# Patient Record
Sex: MALE | Race: ASIAN | Employment: OTHER | URBAN - METROPOLITAN AREA
[De-identification: names, ages, dates, MRNs, and addresses within clinical notes are randomized per-mention and may not be internally consistent; named-entity substitution may affect disease eponyms.]

---

## 2017-01-03 ENCOUNTER — OFFICE VISIT (OUTPATIENT)
Dept: FAMILY MEDICINE CLINIC | Facility: CLINIC | Age: 77
End: 2017-01-03

## 2017-01-03 ENCOUNTER — LAB ENCOUNTER (OUTPATIENT)
Dept: LAB | Age: 77
End: 2017-01-03
Attending: FAMILY MEDICINE

## 2017-01-03 VITALS
TEMPERATURE: 97 F | RESPIRATION RATE: 12 BRPM | DIASTOLIC BLOOD PRESSURE: 60 MMHG | BODY MASS INDEX: 23 KG/M2 | HEART RATE: 76 BPM | WEIGHT: 143 LBS | SYSTOLIC BLOOD PRESSURE: 104 MMHG

## 2017-01-03 DIAGNOSIS — IMO0001 UNCONTROLLED TYPE 2 DIABETES MELLITUS WITHOUT COMPLICATION, WITHOUT LONG-TERM CURRENT USE OF INSULIN: ICD-10-CM

## 2017-01-03 DIAGNOSIS — IMO0001 UNCONTROLLED TYPE 2 DIABETES MELLITUS WITHOUT COMPLICATION, WITHOUT LONG-TERM CURRENT USE OF INSULIN: Primary | ICD-10-CM

## 2017-01-03 DIAGNOSIS — R97.20 ELEVATED PSA: ICD-10-CM

## 2017-01-03 PROBLEM — IMO0002 TYPE 2 DIABETES MELLITUS, UNCONTROLLED: Status: ACTIVE | Noted: 2017-01-03

## 2017-01-03 PROBLEM — E11.65 TYPE 2 DIABETES MELLITUS, UNCONTROLLED (HCC): Status: ACTIVE | Noted: 2017-01-03

## 2017-01-03 LAB
CREAT UR-SCNC: 21 MG/DL
MICROALBUMIN UR-MCNC: 0.62 MG/DL
MICROALBUMIN/CREAT 24H UR-RTO: 29.5 UG/MG (ref ?–30)
PSA FREE MFR SERPL: 29 %
PSA FREE SERPL-MCNC: 1.75 NG/ML
PSA SERPL-MCNC: 5.99 NG/ML (ref 0.01–4)

## 2017-01-03 PROCEDURE — 82043 UR ALBUMIN QUANTITATIVE: CPT

## 2017-01-03 PROCEDURE — 82570 ASSAY OF URINE CREATININE: CPT

## 2017-01-03 PROCEDURE — 84153 ASSAY OF PSA TOTAL: CPT

## 2017-01-03 PROCEDURE — 36415 COLL VENOUS BLD VENIPUNCTURE: CPT

## 2017-01-03 PROCEDURE — 99214 OFFICE O/P EST MOD 30 MIN: CPT | Performed by: FAMILY MEDICINE

## 2017-01-03 PROCEDURE — 84154 ASSAY OF PSA FREE: CPT

## 2017-01-03 RX ORDER — METFORMIN HYDROCHLORIDE 500 MG/1
500 TABLET, EXTENDED RELEASE ORAL
Qty: 90 TABLET | Refills: 1 | Status: SHIPPED | OUTPATIENT
Start: 2017-01-03 | End: 2018-04-20

## 2017-01-03 NOTE — PROGRESS NOTES
New onset of NIDDM    Patient is here to discuss his labs from 12/29/16. There are remarkable for a glucose elevated at 139. A hemoglobin A1c was elevated at 7.8.    We discussed that this was compatible with a diagnosis of NIDDM  We discussed the need to

## 2017-01-18 ENCOUNTER — OFFICE VISIT (OUTPATIENT)
Dept: SLEEP CENTER | Facility: HOSPITAL | Age: 77
End: 2017-01-18
Attending: INTERNAL MEDICINE
Payer: COMMERCIAL

## 2017-01-18 PROCEDURE — 95810 POLYSOM 6/> YRS 4/> PARAM: CPT

## 2017-01-20 NOTE — PROCEDURES
[address]       Accredited by the [facility] ([facility])    PATIENT'S NAME:        Pau Rogers  ATTENDING PHYSICIAN:   Mehul Banks M.D. REFERRING PHYSICIAN:   Ananya Griffin M.D.   PAT total of 19 minutes. Patient spent all night in the supine position. RESPIRATORY MONITORING:  Showed a few hypopneas, no central or obstructive apneas.   Most of his hypopneas happened during REM sleep, he had 9 hypopneas during his 19 minutes of REM sl

## 2017-05-17 ENCOUNTER — TELEPHONE (OUTPATIENT)
Dept: FAMILY MEDICINE CLINIC | Facility: CLINIC | Age: 77
End: 2017-05-17

## 2017-05-17 DIAGNOSIS — E11.9 DIABETIC EYE EXAM (HCC): Primary | ICD-10-CM

## 2017-05-17 DIAGNOSIS — Z01.00 DIABETIC EYE EXAM (HCC): Primary | ICD-10-CM

## 2017-06-12 ENCOUNTER — TELEPHONE (OUTPATIENT)
Dept: FAMILY MEDICINE CLINIC | Facility: CLINIC | Age: 77
End: 2017-06-12

## 2017-06-12 NOTE — TELEPHONE ENCOUNTER
Pt calling to discuss medication with Dr. Valerie Carcamo. Voiced that on his metformin, his blood sugars have been running around 149 before breakfast and around 138 after he exercises.  Pt relayed that he did not take his medication for almost a month or more a

## 2017-06-12 NOTE — TELEPHONE ENCOUNTER
Pt advised to cont taking med as prescribed (pt takes it erratically)   Check blood sugar as prescribed, log it regularly (pt don't regularly check his BS)   Do his due A1C lab work   Follow diabetic diet as recommended    Doing so the provider will be be

## 2017-06-16 ENCOUNTER — APPOINTMENT (OUTPATIENT)
Dept: LAB | Age: 77
End: 2017-06-16
Attending: FAMILY MEDICINE
Payer: COMMERCIAL

## 2017-06-16 DIAGNOSIS — IMO0001 UNCONTROLLED TYPE 2 DIABETES MELLITUS WITHOUT COMPLICATION, WITHOUT LONG-TERM CURRENT USE OF INSULIN: ICD-10-CM

## 2017-06-16 PROCEDURE — 80061 LIPID PANEL: CPT

## 2017-06-16 PROCEDURE — 36415 COLL VENOUS BLD VENIPUNCTURE: CPT

## 2017-06-16 PROCEDURE — 83036 HEMOGLOBIN GLYCOSYLATED A1C: CPT

## 2017-06-16 PROCEDURE — 80053 COMPREHEN METABOLIC PANEL: CPT

## 2017-06-21 ENCOUNTER — TELEPHONE (OUTPATIENT)
Dept: FAMILY MEDICINE CLINIC | Facility: CLINIC | Age: 77
End: 2017-06-21

## 2017-07-07 RX ORDER — BLOOD-GLUCOSE METER
1 EACH MISCELLANEOUS 2 TIMES DAILY
Qty: 1 DEVICE | Refills: 0 | COMMUNITY
Start: 2017-07-07 | End: 2021-04-16

## 2018-04-20 ENCOUNTER — OFFICE VISIT (OUTPATIENT)
Dept: FAMILY MEDICINE CLINIC | Facility: CLINIC | Age: 78
End: 2018-04-20

## 2018-04-20 VITALS
HEIGHT: 66.25 IN | DIASTOLIC BLOOD PRESSURE: 64 MMHG | WEIGHT: 140 LBS | TEMPERATURE: 97 F | SYSTOLIC BLOOD PRESSURE: 120 MMHG | BODY MASS INDEX: 22.5 KG/M2 | RESPIRATION RATE: 16 BRPM | HEART RATE: 68 BPM

## 2018-04-20 DIAGNOSIS — IMO0001 UNCONTROLLED TYPE 2 DIABETES MELLITUS WITHOUT COMPLICATION, WITHOUT LONG-TERM CURRENT USE OF INSULIN: ICD-10-CM

## 2018-04-20 DIAGNOSIS — Z13.220 SCREENING FOR LIPOID DISORDERS: ICD-10-CM

## 2018-04-20 DIAGNOSIS — Z13.89 SCREENING FOR GENITOURINARY CONDITION: ICD-10-CM

## 2018-04-20 DIAGNOSIS — Z13.0 SCREENING, ANEMIA, DEFICIENCY, IRON: ICD-10-CM

## 2018-04-20 DIAGNOSIS — R97.20 ELEVATED PSA: ICD-10-CM

## 2018-04-20 DIAGNOSIS — Z00.00 ANNUAL PHYSICAL EXAM: Primary | ICD-10-CM

## 2018-04-20 PROCEDURE — 99397 PER PM REEVAL EST PAT 65+ YR: CPT | Performed by: FAMILY MEDICINE

## 2018-04-20 RX ORDER — METFORMIN HYDROCHLORIDE 500 MG/1
500 TABLET, EXTENDED RELEASE ORAL
Qty: 90 TABLET | Refills: 1 | Status: SHIPPED | OUTPATIENT
Start: 2018-04-20 | End: 2018-04-23 | Stop reason: DRUGHIGH

## 2018-04-20 NOTE — PROGRESS NOTES
Linda Henry is a 69year old male who presents for a complete physical exam.   HPI:   Pt is he took himself off his metformin. He has noticed that his blood sugars have been higher. Has no other complaints at this time.   .  Wt Readings from Last 6 Encount HEMORRHOIDECTOMY    FAMILY HISTORY:     Family History   Problem Relation Age of Onset   • Hypertension Mother    • Diabetes Mother      type 2       SOCIAL HISTORY   Smoking status: Never Smoker or edema  NEURO: Oriented times three,cranial nerves are intact      ASSESSMENT :   Annual physical exam  (primary encounter diagnosis)  Uncontrolled type 2 diabetes mellitus without complication, without long-term current use of insulin (hcc)  Screening,

## 2018-05-04 ENCOUNTER — PATIENT MESSAGE (OUTPATIENT)
Dept: FAMILY MEDICINE CLINIC | Facility: CLINIC | Age: 78
End: 2018-05-04

## 2018-05-06 NOTE — TELEPHONE ENCOUNTER
Dipak Lemus LPN 2/9/5526 0:39 PM CDT        ----- Message -----  From: Mary Carter  Sent: 5/4/2018 8:33 AM  To: Emg [facility] Clinical Staff  Subject: Test Results Question     Hi, Doctor Guillermo Julian,  I wonder if you have time to go over my recent test resul

## 2018-05-14 NOTE — TELEPHONE ENCOUNTER
Call from pt stating he had labs done approx 2 wks ago-\"never got a call with my results. Have sent messages asking about results and have not heard back. \"  Labs from 5/1/18 reviewed noting result note from shasta DAVIS-\"Dr. Janice Banuelos patient. Tanya Ashley bhatt

## 2018-06-20 NOTE — TELEPHONE ENCOUNTER
From: Jason Silva  Sent: 6/18/2018 7:10 AM CDT  Subject: Medication Renewal Request    Jason Silva would like a refill of the following medications:     MetFORMIN HCl 1000 MG Oral Tab   Patient Comment: In the last week of taking 1000mg, my sugar levels are

## 2018-06-21 NOTE — TELEPHONE ENCOUNTER
From: Kristan Garza  Sent: 6/19/2018 9:57 AM CDT  Subject: Medication Renewal Request    Kristan Garza would like a refill of the following medications:     MetFORMIN HCl 1000 MG Oral Tab   Patient Comment: Doctor Jocelin Harris: I sent you a message yesterday about the result of my medication. It does reduce my sugar levels. Please advise what to do next.     Preferred pharmacy: [address], [address] [place] [address]  [address] [place] [address] [phone], [phone]

## 2018-09-24 NOTE — TELEPHONE ENCOUNTER
*please call pt/schedule med visit october-at April ofc visit dr Teresa Collins advised med visit appt by October 20th. No appts scheduled. Once appt scheduled, please route back to triage for metformin refill.      [facility]-sts they sent

## 2018-11-06 ENCOUNTER — OFFICE VISIT (OUTPATIENT)
Dept: FAMILY MEDICINE CLINIC | Facility: CLINIC | Age: 78
End: 2018-11-06
Payer: COMMERCIAL

## 2018-11-06 VITALS
DIASTOLIC BLOOD PRESSURE: 60 MMHG | RESPIRATION RATE: 12 BRPM | WEIGHT: 139 LBS | HEART RATE: 68 BPM | BODY MASS INDEX: 22.08 KG/M2 | HEIGHT: 66.5 IN | TEMPERATURE: 98 F | SYSTOLIC BLOOD PRESSURE: 110 MMHG

## 2018-11-06 DIAGNOSIS — Z13.0 SCREENING, ANEMIA, DEFICIENCY, IRON: ICD-10-CM

## 2018-11-06 DIAGNOSIS — R97.20 ELEVATED PSA: ICD-10-CM

## 2018-11-06 DIAGNOSIS — E11.65 UNCONTROLLED TYPE 2 DIABETES MELLITUS WITH HYPERGLYCEMIA (HCC): Primary | ICD-10-CM

## 2018-11-06 DIAGNOSIS — Z13.220 SCREENING FOR LIPOID DISORDERS: ICD-10-CM

## 2018-11-06 PROCEDURE — 99214 OFFICE O/P EST MOD 30 MIN: CPT | Performed by: FAMILY MEDICINE

## 2018-11-06 NOTE — PROGRESS NOTES
Dawson Billings is a 69year old male. HPI:   The patient presents for recheck of his diabetes. Patient’s FBS have been 120-150. Last HgbA1c was 9.6 %, done on 5/1/2018. Last visit with ophthalmologist was 4/16/2018.  Last microalbumin was less than 0.2 on 5/1/ normal, visualized feet and the appearance is normal. Bilateral monofilament/sensation of both feet is normal.    ASSESSMENT AND PLAN:   1.  Uncontrolled type 2 diabetes mellitus with hyperglycemia (HCC)  Continue metformin 1000 mg p.o. twice daily  - COMP

## 2018-12-07 ENCOUNTER — TELEPHONE (OUTPATIENT)
Dept: FAMILY MEDICINE CLINIC | Facility: CLINIC | Age: 78
End: 2018-12-07

## 2018-12-07 DIAGNOSIS — Z13.220 SCREENING FOR LIPOID DISORDERS: ICD-10-CM

## 2018-12-07 DIAGNOSIS — E11.65 UNCONTROLLED TYPE 2 DIABETES MELLITUS WITH HYPERGLYCEMIA (HCC): Primary | ICD-10-CM

## 2018-12-07 DIAGNOSIS — R97.20 ELEVATED PSA: ICD-10-CM

## 2018-12-07 DIAGNOSIS — Z13.0 SCREENING, ANEMIA, DEFICIENCY, IRON: ICD-10-CM

## 2018-12-07 NOTE — TELEPHONE ENCOUNTER
Pt transferred to nurse. He reports [facility] this am and no orders were there. I apologized and advised his orders look like they were placed for edw.     He said he s/w a nurse this am and they were supposed to fax orders to [facility]? He said it

## 2018-12-11 DIAGNOSIS — E11.65 UNCONTROLLED TYPE 2 DIABETES MELLITUS WITH HYPERGLYCEMIA (HCC): ICD-10-CM

## 2018-12-11 DIAGNOSIS — R73.9 HYPERGLYCEMIA: Primary | ICD-10-CM

## 2018-12-11 DIAGNOSIS — E78.00 ELEVATED LDL CHOLESTEROL LEVEL: ICD-10-CM

## 2018-12-12 ENCOUNTER — TELEPHONE (OUTPATIENT)
Dept: FAMILY MEDICINE CLINIC | Facility: CLINIC | Age: 78
End: 2018-12-12

## 2018-12-12 NOTE — TELEPHONE ENCOUNTER
Patient requests MetFORMIN HCl 1000 MG Oral Tab sent to [facility]. sent to [facility]. on [address]. He does not need the coupon for Januvia anymore. That was the original reason for this phone call.

## 2018-12-12 NOTE — TELEPHONE ENCOUNTER
Pt called and he said that he needs a coupon for Januvia. He said that Dr. Melody Layne. Usually does this for him. Please advise.

## 2018-12-12 NOTE — TELEPHONE ENCOUNTER
I checked sample closet. We do have coupon cards. They  2018  I have left up front for him to . Front staff to let him know.

## 2018-12-12 NOTE — TELEPHONE ENCOUNTER
Per chart pt should have refill on this still. Cornelius Mariano confirms this. Tc to pt and let him know. He voiced understanding.

## 2019-03-11 RX ORDER — SITAGLIPTIN 50 MG/1
TABLET, FILM COATED ORAL
Qty: 90 TABLET | Refills: 0 | Status: SHIPPED | OUTPATIENT
Start: 2019-03-11 | End: 2019-06-01

## 2019-03-27 NOTE — TELEPHONE ENCOUNTER
metFORMIN HCl Oral Tablet 1000 MG  Diabetic Medication Protocol Failed    He failed protocol due to his last Hemoglobin A1c. His last A1c was on 12/07/2018 with a level of 7.8. Please see pended medications. Please sign if appropriate.       Thank

## 2019-06-03 RX ORDER — SITAGLIPTIN 50 MG/1
TABLET, FILM COATED ORAL
Qty: 90 TABLET | Refills: 0 | Status: SHIPPED | OUTPATIENT
Start: 2019-06-03 | End: 2019-12-11

## 2019-06-03 NOTE — TELEPHONE ENCOUNTER
Return in about 6 months (around 5/6/2019) for NIDDM.    Please call to schedule with Dr. Tania Davis

## 2019-06-24 ENCOUNTER — TELEPHONE (OUTPATIENT)
Dept: FAMILY MEDICINE CLINIC | Facility: CLINIC | Age: 79
End: 2019-06-24

## 2019-06-24 ENCOUNTER — OFFICE VISIT (OUTPATIENT)
Dept: FAMILY MEDICINE CLINIC | Facility: CLINIC | Age: 79
End: 2019-06-24
Payer: COMMERCIAL

## 2019-06-24 VITALS
WEIGHT: 139 LBS | HEART RATE: 64 BPM | HEIGHT: 66.5 IN | SYSTOLIC BLOOD PRESSURE: 124 MMHG | TEMPERATURE: 97 F | BODY MASS INDEX: 22.08 KG/M2 | DIASTOLIC BLOOD PRESSURE: 70 MMHG | RESPIRATION RATE: 12 BRPM

## 2019-06-24 DIAGNOSIS — E11.9 TYPE 2 DIABETES MELLITUS WITHOUT COMPLICATION, WITHOUT LONG-TERM CURRENT USE OF INSULIN (HCC): Primary | ICD-10-CM

## 2019-06-24 DIAGNOSIS — R97.20 ELEVATED PSA: ICD-10-CM

## 2019-06-24 DIAGNOSIS — Z13.0 SCREENING, ANEMIA, DEFICIENCY, IRON: ICD-10-CM

## 2019-06-24 PROCEDURE — 99214 OFFICE O/P EST MOD 30 MIN: CPT | Performed by: FAMILY MEDICINE

## 2019-06-24 NOTE — PROGRESS NOTES
Cristina Dover is a 67year old male. HPI:   The patient presents for recheck of his diabetes. Patient’s FBS have been 120-150. Last HgbA1c was 6.6 %, done on 6/19/2019. Last visit with ophthalmologist was 4/16/2018. Last microalbumin was 5 on 12/7/2018.  Pt h edema  Bilateral barefoot skin diabetic exam is normal, visualized feet and the appearance is normal. Bilateral monofilament/sensation of both feet is normal.    ASSESSMENT AND PLAN:   1.  Type 2 diabetes mellitus without complication, without long-term cur

## 2019-06-25 ENCOUNTER — MED REC SCAN ONLY (OUTPATIENT)
Dept: FAMILY MEDICINE CLINIC | Facility: CLINIC | Age: 79
End: 2019-06-25

## 2019-09-03 ENCOUNTER — PATIENT MESSAGE (OUTPATIENT)
Dept: FAMILY MEDICINE CLINIC | Facility: CLINIC | Age: 79
End: 2019-09-03

## 2019-09-04 NOTE — TELEPHONE ENCOUNTER
From: Anna Marie Barnett  To: Mike Kelly MD  Sent: 9/3/2019 3:47 PM CDT  Subject: Other    Hi, Doctor Cyndi Prim:  I need three-month supply of my metformin for [place] this [place]. Thanks!   Anna Marie Barnett

## 2019-09-18 NOTE — TELEPHONE ENCOUNTER
Pt requesting 3 month refill of METFORMIN HCL 1000 MG Oral Tab. A 1 month refill was sent in last time when he requested 3 months. Sent to [place] in chart. Please advise.

## 2019-11-01 ENCOUNTER — TELEPHONE (OUTPATIENT)
Dept: FAMILY MEDICINE CLINIC | Facility: CLINIC | Age: 79
End: 2019-11-01

## 2019-11-01 NOTE — TELEPHONE ENCOUNTER
Spoke with Carlos Diaz at pharmacy and she states pt has refill ready for pickup and they will call him. Carlos Diaz states she has no record of pt calling for refill.

## 2019-11-01 NOTE — TELEPHONE ENCOUNTER
When asked if pt has requested rx through pharmacy. Pt states his pharmacy told him they have been trying to contact us to refill pt's rx. Pt states he would like 3 refills if possible. Please refill, thank you.      Requesting rx-  metFORMIN HCl 1000 MG Or

## 2019-12-04 ENCOUNTER — OFFICE VISIT (OUTPATIENT)
Dept: FAMILY MEDICINE CLINIC | Facility: CLINIC | Age: 79
End: 2019-12-04
Payer: COMMERCIAL

## 2019-12-04 VITALS
BODY MASS INDEX: 22.29 KG/M2 | HEART RATE: 84 BPM | RESPIRATION RATE: 16 BRPM | TEMPERATURE: 98 F | SYSTOLIC BLOOD PRESSURE: 100 MMHG | DIASTOLIC BLOOD PRESSURE: 62 MMHG | WEIGHT: 142 LBS | HEIGHT: 67 IN

## 2019-12-04 DIAGNOSIS — M25.521 RIGHT ELBOW PAIN: Primary | ICD-10-CM

## 2019-12-04 DIAGNOSIS — T14.8XXA MUSCLE STRAIN: ICD-10-CM

## 2019-12-04 PROCEDURE — 99213 OFFICE O/P EST LOW 20 MIN: CPT | Performed by: NURSE PRACTITIONER

## 2019-12-04 RX ORDER — DICLOFENAC SODIUM 75 MG/1
75 TABLET, DELAYED RELEASE ORAL 2 TIMES DAILY
Qty: 20 TABLET | Refills: 0 | Status: SHIPPED | OUTPATIENT
Start: 2019-12-04 | End: 2019-12-14

## 2019-12-04 NOTE — PROGRESS NOTES
Delfino Bright is a 67year old male. HPI:   Patient presents today reporting right elbow pain--primarily to [facility] area--reports pain started after he was trying to  his 15 year old grandchild who was flailing around- occurred when he was in [place].  Yash Husbands auscultation no rales, rhonchi or wheezes  CARDIO: RRR without murmur   EXTREMITIES: no cyanosis, clubbing or edema +2 radial pulses. Musculoskeletal: No discomfort with palpation over right elbow.  No discomfort with palpation over medial or lateral epico patient indicates understanding of these issues and agrees to the plan. The patient is asked to return if symptoms worsen or fail to improve.

## 2019-12-11 RX ORDER — SITAGLIPTIN 50 MG/1
TABLET, FILM COATED ORAL
Qty: 90 TABLET | Refills: 0 | Status: SHIPPED | OUTPATIENT
Start: 2019-12-11 | End: 2020-03-10

## 2019-12-16 ENCOUNTER — TELEPHONE (OUTPATIENT)
Dept: FAMILY MEDICINE CLINIC | Facility: CLINIC | Age: 79
End: 2019-12-16

## 2019-12-16 NOTE — TELEPHONE ENCOUNTER
Dr. Birgit Green. [phone]. T.C. to pt. He does not want to see anyone at this time. He does not feel it is necessary. I advised pt to CB if he changes his mind.

## 2019-12-16 NOTE — TELEPHONE ENCOUNTER
Pt was told to call with update after Diclofenac Sodium 75 MG Oral Tab EC he states pain is better but still feels tight. He would like to know what to do please advise. Thank you.

## 2020-01-20 ENCOUNTER — TELEPHONE (OUTPATIENT)
Dept: FAMILY MEDICINE CLINIC | Facility: CLINIC | Age: 80
End: 2020-01-20

## 2020-01-20 NOTE — TELEPHONE ENCOUNTER
My chart msg received:    I am out of metformin by the end of this month. Please send the prescription for 3 months to [facility] at [address],[place] from now on.

## 2020-03-03 ENCOUNTER — TELEPHONE (OUTPATIENT)
Dept: FAMILY MEDICINE CLINIC | Facility: CLINIC | Age: 80
End: 2020-03-03

## 2020-03-03 RX ORDER — SITAGLIPTIN 50 MG/1
TABLET, FILM COATED ORAL
Qty: 90 TABLET | Refills: 0 | OUTPATIENT
Start: 2020-03-03

## 2020-03-10 NOTE — TELEPHONE ENCOUNTER
Pt states his last 2 appts got cancelled from our office and he just took his last dose today and needs a refill. He did not make another appt because he is inquiring about switching providers.  I will be sending a message to get their approval in a separat

## 2020-03-10 NOTE — TELEPHONE ENCOUNTER
rx ordered for 1 month supply since pt has not kept appts and is switching providers. Left detailed message for pt.

## 2020-03-12 RX ORDER — SITAGLIPTIN 50 MG/1
TABLET, FILM COATED ORAL
Qty: 90 TABLET | Refills: 0 | Status: SHIPPED | OUTPATIENT
Start: 2020-03-12 | End: 2020-05-26

## 2020-05-26 RX ORDER — SITAGLIPTIN 50 MG/1
TABLET, FILM COATED ORAL
Qty: 90 TABLET | Refills: 0 | Status: SHIPPED | OUTPATIENT
Start: 2020-05-26 | End: 2020-08-21

## 2020-08-05 NOTE — MR AVS SNAPSHOT
After Visit Summary   1/18/2017    Cristina Dover    MRN: [id number]           Visit Information        Provider Department Dept Phone    1/18/2017 10:00 PM [facility]  [facility] [phone]      Allergies as of 1/18/2017  Reviewed on: 1/3/2017    No Kn non-verbal indicators of pain/discomfort absent

## 2020-08-20 ENCOUNTER — OFFICE VISIT (OUTPATIENT)
Dept: FAMILY MEDICINE CLINIC | Facility: CLINIC | Age: 80
End: 2020-08-20
Payer: COMMERCIAL

## 2020-08-20 VITALS
SYSTOLIC BLOOD PRESSURE: 112 MMHG | DIASTOLIC BLOOD PRESSURE: 76 MMHG | WEIGHT: 135 LBS | RESPIRATION RATE: 16 BRPM | HEIGHT: 66.5 IN | HEART RATE: 76 BPM | BODY MASS INDEX: 21.44 KG/M2 | TEMPERATURE: 98 F

## 2020-08-20 DIAGNOSIS — R97.20 ELEVATED PSA: ICD-10-CM

## 2020-08-20 DIAGNOSIS — Z00.00 ANNUAL PHYSICAL EXAM: Primary | ICD-10-CM

## 2020-08-20 DIAGNOSIS — Z12.11 SCREENING FOR COLON CANCER: ICD-10-CM

## 2020-08-20 DIAGNOSIS — Z13.0 SCREENING, ANEMIA, DEFICIENCY, IRON: ICD-10-CM

## 2020-08-20 DIAGNOSIS — E11.9 TYPE 2 DIABETES MELLITUS WITHOUT COMPLICATION, WITHOUT LONG-TERM CURRENT USE OF INSULIN (HCC): ICD-10-CM

## 2020-08-20 PROCEDURE — 3074F SYST BP LT 130 MM HG: CPT | Performed by: FAMILY MEDICINE

## 2020-08-20 PROCEDURE — 3078F DIAST BP <80 MM HG: CPT | Performed by: FAMILY MEDICINE

## 2020-08-20 PROCEDURE — 99397 PER PM REEVAL EST PAT 65+ YR: CPT | Performed by: FAMILY MEDICINE

## 2020-08-20 PROCEDURE — 3008F BODY MASS INDEX DOCD: CPT | Performed by: FAMILY MEDICINE

## 2020-08-20 NOTE — PROGRESS NOTES
North Ortega is a 79year old male who presents for a complete physical exam.   HPI:   The patient presents for recheck of his diabetes. Patient’s FBS have been 120-150. Last HgbA1c was 6.6 %, done on 6/19/2019. Last visit with ophthalmologist was 6/24/2019. History:   Procedure Laterality Date   • COLONOSCOPY     • OTHER SURGICAL HISTORY  1/1[birth year]87    HEMORRHOIDECTOMY       FAMILY HISTORY:     Family History   Problem Relation Age of Onset   • Hypertension Mother    • Diabetes Mother         type 2       SOCIAL prostate enlarged, smooth but shows no masses, stool is OB negative  MUSCULOSKELETAL: back is not tender, no joint swelling noted  EXTREMITIES: no cyanosis, clubbing or edema  NEURO: Oriented times three,cranial nerves are intact      ASSESSMENT :   Annual

## 2020-08-21 RX ORDER — SITAGLIPTIN 50 MG/1
TABLET, FILM COATED ORAL
Qty: 90 TABLET | Refills: 0 | Status: SHIPPED | OUTPATIENT
Start: 2020-08-21 | End: 2020-11-16

## 2020-08-22 LAB
ABSOLUTE BASOPHILS: 33 CELLS/UL (ref 0–200)
ABSOLUTE EOSINOPHILS: 78 CELLS/UL (ref 15–500)
ABSOLUTE LYMPHOCYTES: 2444 CELLS/UL (ref 850–3900)
ABSOLUTE MONOCYTES: 371 CELLS/UL (ref 200–950)
ABSOLUTE NEUTROPHILS: 3575 CELLS/UL (ref 1500–7800)
ALBUMIN/GLOBULIN RATIO: 1.3 (CALC) (ref 1–2.5)
ALBUMIN: 4.4 G/DL (ref 3.6–5.1)
ALKALINE PHOSPHATASE: 41 U/L (ref 35–144)
ALT: 18 U/L (ref 9–46)
AST: 21 U/L (ref 10–35)
BASOPHILS: 0.5 %
BILIRUBIN, TOTAL: 0.6 MG/DL (ref 0.2–1.2)
BUN: 17 MG/DL (ref 7–25)
CALCIUM: 10.1 MG/DL (ref 8.6–10.3)
CARBON DIOXIDE: 30 MMOL/L (ref 20–32)
CHLORIDE: 102 MMOL/L (ref 98–110)
CHOL/HDLC RATIO: 3 (CALC)
CHOLESTEROL, TOTAL: 178 MG/DL
CREATININE, RANDOM URINE: 88 MG/DL (ref 20–320)
CREATININE: 1.17 MG/DL (ref 0.7–1.18)
EGFR IF AFRICN AM: 68 ML/MIN/1.73M2
EGFR IF NONAFRICN AM: 59 ML/MIN/1.73M2
EOSINOPHILS: 1.2 %
GLOBULIN: 3.3 G/DL (CALC) (ref 1.9–3.7)
GLUCOSE: 133 MG/DL (ref 65–99)
HDL CHOLESTEROL: 59 MG/DL
HEMATOCRIT: 40.1 % (ref 38.5–50)
HEMOGLOBIN A1C: 6.8 % OF TOTAL HGB
HEMOGLOBIN: 13.6 G/DL (ref 13.2–17.1)
LDL-CHOLESTEROL: 100 MG/DL (CALC)
LYMPHOCYTES: 37.6 %
MCH: 32.2 PG (ref 27–33)
MCHC: 33.9 G/DL (ref 32–36)
MCV: 94.8 FL (ref 80–100)
MICROALBUMIN/CREATININE RATIO, RANDOM URINE: 15 MCG/MG CREAT
MICROALBUMIN: 1.3 MG/DL
MONOCYTES: 5.7 %
MPV: 9.4 FL (ref 7.5–12.5)
NEUTROPHILS: 55 %
NON-HDL CHOLESTEROL: 119 MG/DL (CALC)
PLATELET COUNT: 208 THOUSAND/UL (ref 140–400)
POTASSIUM: 4.6 MMOL/L (ref 3.5–5.3)
PROTEIN, TOTAL: 7.7 G/DL (ref 6.1–8.1)
PSA, TOTAL: 8.1 NG/ML
RDW: 13 % (ref 11–15)
RED BLOOD CELL COUNT: 4.23 MILLION/UL (ref 4.2–5.8)
SODIUM: 138 MMOL/L (ref 135–146)
TRIGLYCERIDES: 96 MG/DL
WHITE BLOOD CELL COUNT: 6.5 THOUSAND/UL (ref 3.8–10.8)

## 2020-09-01 ENCOUNTER — PATIENT MESSAGE (OUTPATIENT)
Dept: FAMILY MEDICINE CLINIC | Facility: CLINIC | Age: 80
End: 2020-09-01

## 2020-09-01 DIAGNOSIS — R97.20 ELEVATED PSA: Primary | ICD-10-CM

## 2020-09-01 DIAGNOSIS — Z13.89 SCREENING FOR GENITOURINARY CONDITION: ICD-10-CM

## 2020-09-04 ENCOUNTER — TELEPHONE (OUTPATIENT)
Dept: FAMILY MEDICINE CLINIC | Facility: CLINIC | Age: 80
End: 2020-09-04

## 2020-09-04 NOTE — TELEPHONE ENCOUNTER
Wellness form faxed to Miriam at [phone]. Pt notified. Form sent to scan to go into patients chart.

## 2020-09-29 ENCOUNTER — MED REC SCAN ONLY (OUTPATIENT)
Dept: FAMILY MEDICINE CLINIC | Facility: CLINIC | Age: 80
End: 2020-09-29

## 2020-09-29 DIAGNOSIS — E11.65 UNCONTROLLED TYPE 2 DIABETES MELLITUS WITH HYPERGLYCEMIA (HCC): Primary | ICD-10-CM

## 2020-11-06 NOTE — TELEPHONE ENCOUNTER
[place], [place] 11/5/2020 7:12 PM CST      ----- Message -----  From: Dawson Billings  Sent: 11/5/2020 11:06 AM CST  To: Em[facility] 11 Clinical Staff  Subject: RE: PSA     Doc,  I would like to have another blood test (psa , urine etc.) this month to decide

## 2020-11-13 ENCOUNTER — PATIENT MESSAGE (OUTPATIENT)
Dept: FAMILY MEDICINE CLINIC | Facility: CLINIC | Age: 80
End: 2020-11-13

## 2020-11-14 ENCOUNTER — MED REC SCAN ONLY (OUTPATIENT)
Dept: FAMILY MEDICINE CLINIC | Facility: CLINIC | Age: 80
End: 2020-11-14

## 2020-11-16 RX ORDER — SITAGLIPTIN 50 MG/1
TABLET, FILM COATED ORAL
Qty: 90 TABLET | Refills: 0 | Status: SHIPPED | OUTPATIENT
Start: 2020-11-16 | End: 2020-12-07

## 2020-11-16 NOTE — TELEPHONE ENCOUNTER
From: Dorian Bowden  To: Barbara Boone MD  Sent: 11/13/2020 4:06 PM CST  Subject: Test Results Question    Dr. Huey Gary:  I saw the recent test result, and my PSA is much higher. Can you recommend some urologist near by your office? Thanks.   Dorian Bowden

## 2020-12-04 ENCOUNTER — OFFICE VISIT (OUTPATIENT)
Dept: SURGERY | Facility: CLINIC | Age: 80
End: 2020-12-04
Payer: COMMERCIAL

## 2020-12-04 VITALS — HEART RATE: 79 BPM | SYSTOLIC BLOOD PRESSURE: 132 MMHG | DIASTOLIC BLOOD PRESSURE: 79 MMHG | TEMPERATURE: 97 F

## 2020-12-04 DIAGNOSIS — N40.0 ENLARGED PROSTATE: ICD-10-CM

## 2020-12-04 DIAGNOSIS — R39.9 LOWER URINARY TRACT SYMPTOMS: ICD-10-CM

## 2020-12-04 DIAGNOSIS — R97.20 ELEVATED PSA: ICD-10-CM

## 2020-12-04 DIAGNOSIS — R82.90 URINE FINDING: Primary | ICD-10-CM

## 2020-12-04 PROCEDURE — 99203 OFFICE O/P NEW LOW 30 MIN: CPT | Performed by: UROLOGY

## 2020-12-04 PROCEDURE — 3075F SYST BP GE 130 - 139MM HG: CPT | Performed by: UROLOGY

## 2020-12-04 PROCEDURE — 81003 URINALYSIS AUTO W/O SCOPE: CPT | Performed by: UROLOGY

## 2020-12-04 PROCEDURE — 3078F DIAST BP <80 MM HG: CPT | Performed by: UROLOGY

## 2020-12-04 RX ORDER — TAMSULOSIN HYDROCHLORIDE 0.4 MG/1
0.4 CAPSULE ORAL DAILY
Qty: 30 CAPSULE | Refills: 3 | Status: SHIPPED | OUTPATIENT
Start: 2020-12-04 | End: 2021-01-03

## 2020-12-04 NOTE — PROGRESS NOTES
Rooming Clinician:     Iris Osullivan is a 79year old male.   Patient presents with:  Consult: c/o Elevated PSA  Elevated PSA:  Previous Biopsy: NO  Stream: WEAK  Nocturia 1 time  Straining to urinate: NO  Empty after urination: YES  Post void dribbling: NO  H sensory or motor complaint    EXAM:     /79 (BP Location: Left arm, Patient Position: Sitting, Cuff Size: large)   Pulse 79   Temp 97.3 °F (36.3 °C)   GENERAL: well developed, well nourished,in no apparent distress  SKIN: no rashes,no suspicious lesi cognitive biopsy based on MRI findings as well as steriotactic biopsy as well as  histologic tissue testing on previous biopsy tissue. We talked about the risks and complications of these procedure.        I have discussed BPH with the patient including it

## 2020-12-05 DIAGNOSIS — E11.65 UNCONTROLLED TYPE 2 DIABETES MELLITUS WITH HYPERGLYCEMIA (HCC): ICD-10-CM

## 2020-12-07 RX ORDER — SITAGLIPTIN 50 MG/1
TABLET, FILM COATED ORAL
Qty: 90 TABLET | Refills: 0 | Status: SHIPPED | OUTPATIENT
Start: 2020-12-07 | End: 2021-05-03

## 2020-12-09 ENCOUNTER — LAB ENCOUNTER (OUTPATIENT)
Dept: LAB | Age: 80
End: 2020-12-09
Attending: UROLOGY
Payer: COMMERCIAL

## 2020-12-09 DIAGNOSIS — Z00.00 ROUTINE GENERAL MEDICAL EXAMINATION AT A HEALTH CARE FACILITY: Primary | ICD-10-CM

## 2020-12-09 PROCEDURE — 36415 COLL VENOUS BLD VENIPUNCTURE: CPT

## 2020-12-14 ENCOUNTER — TELEPHONE (OUTPATIENT)
Dept: SURGERY | Facility: CLINIC | Age: 80
End: 2020-12-14

## 2020-12-15 NOTE — TELEPHONE ENCOUNTER
[facility] [employer]. Said, patient did NOT sign and date the form. Cj Mrinaya to fax it to us.

## 2020-12-18 ENCOUNTER — TELEPHONE (OUTPATIENT)
Dept: SURGERY | Facility: CLINIC | Age: 80
End: 2020-12-18

## 2020-12-18 NOTE — TELEPHONE ENCOUNTER
RN received a fax from [facility] stating that they are unable to process the test due to incomplete form. Needs signature from the patient. RN called patient. Left message that 4Kscore requisition form needs a signature.  Please transfer call to ext [phone]

## 2020-12-23 ENCOUNTER — PATIENT MESSAGE (OUTPATIENT)
Dept: SURGERY | Facility: CLINIC | Age: 80
End: 2020-12-23

## 2020-12-28 ENCOUNTER — TELEPHONE (OUTPATIENT)
Dept: SURGERY | Facility: CLINIC | Age: 80
End: 2020-12-28

## 2020-12-28 NOTE — TELEPHONE ENCOUNTER
Below is patient's message sent via [address] [place] [address] [address]. From: Mary Carter  To: Kasey Luo MD  Sent: 12/23/2020 11:57 AM CST  Subject: Test Results Question    Hi, :  I have not received the results of the 4K test. Do you have any news for me?

## 2020-12-28 NOTE — TELEPHONE ENCOUNTER
RN left this patient a message last week and is waiting for a call back. RN also sent a message back today in reply to his email about the results.

## 2020-12-29 ENCOUNTER — TELEPHONE (OUTPATIENT)
Dept: SURGERY | Facility: CLINIC | Age: 80
End: 2020-12-29

## 2020-12-29 NOTE — TELEPHONE ENCOUNTER
RN received a fax information from [address] [place] [address] asking to sign and complete the form. RN called [address] [place] [address] and informed that completed form were faxed yesterday, 12/28. Jose Kang will release results today.

## 2020-12-29 NOTE — TELEPHONE ENCOUNTER
RN faxed the information requested today, 12/29 to [facility] at [phone]: MRI requisition, CBC, CMP and PSA.

## 2020-12-29 NOTE — TELEPHONE ENCOUNTER
RN received a fax information from [facility].  Hard copy result forwarded to MD for review

## 2020-12-29 NOTE — TELEPHONE ENCOUNTER
Per Joycelyn Turner, pt will be having MRI at [address], please fax order to [phone]. Authorization #[id number]. Stating they are also needing blood lab results faxed.     Please advise

## 2021-01-06 ENCOUNTER — TELEPHONE (OUTPATIENT)
Dept: SURGERY | Facility: CLINIC | Age: 81
End: 2021-01-06

## 2021-01-06 NOTE — TELEPHONE ENCOUNTER
Lab received orders on 12/9/2021, but no demographics was sent and need dx on the order. . Please fax missing information to fax # [phone] ATTN: Angle Tinsley.

## 2021-01-06 NOTE — TELEPHONE ENCOUNTER
Lengthy telephone call explaining 4K result which was 16%. Free PSA was 31%. Prostate is palpably enlarged, 70 to 90 g. It is likely that the total PSA elevation is related to the size of the prostate.   Patient still is concerned would like to pursue pr

## 2021-01-12 ENCOUNTER — TELEPHONE (OUTPATIENT)
Dept: SURGERY | Facility: CLINIC | Age: 81
End: 2021-01-12

## 2021-01-12 NOTE — TELEPHONE ENCOUNTER
Per Ernie, they are needing last PSA test results.  Pt is scheduled for MRI on 1/15/21  Please fax to [phone]  Please advise

## 2021-01-18 ENCOUNTER — PATIENT MESSAGE (OUTPATIENT)
Dept: SURGERY | Facility: CLINIC | Age: 81
End: 2021-01-18

## 2021-01-19 RX ORDER — FINASTERIDE 5 MG/1
5 TABLET, FILM COATED ORAL DAILY
Qty: 90 TABLET | Refills: 3 | Status: SHIPPED | OUTPATIENT
Start: 2021-01-19 | End: 2021-04-16

## 2021-01-19 NOTE — TELEPHONE ENCOUNTER
Below is patient's message sent via [employer]:     From: Mary Carter  To: Kasey Luo MD  Sent: 1/18/2021  3:28 PM CST  Subject: Non-Urgent Medical Question    Adri Flores: Thank you very much for the info. I have been taking Fox for almost a month.

## 2021-02-22 ENCOUNTER — TELEPHONE (OUTPATIENT)
Dept: SURGERY | Facility: CLINIC | Age: 81
End: 2021-02-22

## 2021-02-22 NOTE — TELEPHONE ENCOUNTER
Pt wants to know what's the purpose of him taking the tamsulosin  Is it for his BPH and incontinence  please advise

## 2021-02-23 RX ORDER — DUTASTERIDE 0.5 MG/1
0.5 CAPSULE, LIQUID FILLED ORAL DAILY
Qty: 90 CAPSULE | Refills: 3 | Status: SHIPPED | OUTPATIENT
Start: 2021-02-23

## 2021-02-23 NOTE — TELEPHONE ENCOUNTER
Below is patient's message sent via [address]: \"Per pt requesting to speak to Kasey Colindres, has a few questions. Please advise \"     Note: Roz Mccauley called patient back today, 2/23.  \"He stated that he has not seen a change in his stream since he started his T

## 2021-02-23 NOTE — TELEPHONE ENCOUNTER
RN replied to patient via [employer] after receiving an order from Dr North Parker:       [address] to dc tamsulosin.  Offer dutasteride . 5mg daily  this will help reduce prostate size slowly over time.     Message text      \"He agreed that you DISCONTINUE Tamsulosin, bu

## 2021-02-23 NOTE — TELEPHONE ENCOUNTER
I called the pt back. He stated that he has not seen a change in his stream since he started his Tamsulosin. Pt is asking to stop the medication. Pt is also concerned because his urge incontinence has increased.   RP please advise, thanks

## 2021-02-23 NOTE — TELEPHONE ENCOUNTER
RN called patient to relay MD's message and recommendations. RN spoke to patient at length about the new medication and side effects. Also discussed about Kegel exercise, journal, avoidance of bladder irritant diets and recommended warm baths.  Patient than

## 2021-02-26 NOTE — TELEPHONE ENCOUNTER
Per pt also states needs to be 90 day supply for insurance to cover medication. Please advise thank you.

## 2021-02-26 NOTE — TELEPHONE ENCOUNTER
RN called patient in response to his call:\"Per pt also states needs to be 90 day supply for insurance to cover medication. Please advise thank you. \" Patient explained that medication was sent to wrong pharmacy.  Currently, it is [facility], but he prefers [facility]

## 2021-02-26 NOTE — TELEPHONE ENCOUNTER
Pt called stating pt spoke to the nurse. Pt's rx. Was sent to the wrong pharmacy. [facility] [place].   Call pt

## 2021-03-04 DIAGNOSIS — Z23 NEED FOR VACCINATION: ICD-10-CM

## 2021-03-05 DIAGNOSIS — Z13.220 SCREENING FOR LIPOID DISORDERS: Primary | ICD-10-CM

## 2021-03-05 DIAGNOSIS — E11.65 UNCONTROLLED TYPE 2 DIABETES MELLITUS WITH HYPERGLYCEMIA (HCC): ICD-10-CM

## 2021-04-16 ENCOUNTER — OFFICE VISIT (OUTPATIENT)
Dept: FAMILY MEDICINE CLINIC | Facility: CLINIC | Age: 81
End: 2021-04-16
Payer: COMMERCIAL

## 2021-04-16 VITALS
RESPIRATION RATE: 16 BRPM | DIASTOLIC BLOOD PRESSURE: 64 MMHG | WEIGHT: 141 LBS | HEIGHT: 66.5 IN | SYSTOLIC BLOOD PRESSURE: 114 MMHG | HEART RATE: 68 BPM | BODY MASS INDEX: 22.39 KG/M2

## 2021-04-16 DIAGNOSIS — E11.9 TYPE 2 DIABETES MELLITUS WITHOUT COMPLICATION, WITHOUT LONG-TERM CURRENT USE OF INSULIN (HCC): Primary | ICD-10-CM

## 2021-04-16 DIAGNOSIS — R39.12 BENIGN PROSTATIC HYPERPLASIA WITH WEAK URINARY STREAM: ICD-10-CM

## 2021-04-16 DIAGNOSIS — N40.1 BENIGN PROSTATIC HYPERPLASIA WITH WEAK URINARY STREAM: ICD-10-CM

## 2021-04-16 DIAGNOSIS — R97.20 ELEVATED PSA: ICD-10-CM

## 2021-04-16 PROCEDURE — 99214 OFFICE O/P EST MOD 30 MIN: CPT | Performed by: FAMILY MEDICINE

## 2021-04-16 PROCEDURE — 3074F SYST BP LT 130 MM HG: CPT | Performed by: FAMILY MEDICINE

## 2021-04-16 PROCEDURE — 3078F DIAST BP <80 MM HG: CPT | Performed by: FAMILY MEDICINE

## 2021-04-16 PROCEDURE — 3008F BODY MASS INDEX DOCD: CPT | Performed by: FAMILY MEDICINE

## 2021-04-16 NOTE — PROGRESS NOTES
Prashanth Atwood is a 79year old male. HPI:   The patient presents for recheck of his diabetes. Patient’s FBS have been 120-150. Last HgbA1c was 7.4 %, done on 4/12/2021. Last visit with ophthalmologist was 4/16/2018. Last microalbumin was 6 on 4/12/2021.  Pt h appearance is normal. Bilateral monofilament/sensation of both feet is normal.    ASSESSMENT AND PLAN:   1.  Type 2 diabetes mellitus without complication, without long-term current use of insulin (HCC)  Continue Metformin 1000 mg 1 tablet twice daily  Cont

## 2021-04-21 ENCOUNTER — MED REC SCAN ONLY (OUTPATIENT)
Dept: FAMILY MEDICINE CLINIC | Facility: CLINIC | Age: 81
End: 2021-04-21

## 2021-05-03 RX ORDER — SITAGLIPTIN 50 MG/1
TABLET, FILM COATED ORAL
Qty: 90 TABLET | Refills: 0 | Status: SHIPPED | OUTPATIENT
Start: 2021-05-03 | End: 2021-07-26

## 2021-06-04 DIAGNOSIS — E11.65 UNCONTROLLED TYPE 2 DIABETES MELLITUS WITH HYPERGLYCEMIA (HCC): ICD-10-CM

## 2021-07-16 ENCOUNTER — TELEPHONE (OUTPATIENT)
Dept: FAMILY MEDICINE CLINIC | Facility: CLINIC | Age: 81
End: 2021-07-16

## 2021-07-16 DIAGNOSIS — Z13.0 SCREENING, ANEMIA, DEFICIENCY, IRON: ICD-10-CM

## 2021-07-16 DIAGNOSIS — Z87.898 HISTORY OF ELEVATED PSA: Primary | ICD-10-CM

## 2021-07-16 NOTE — TELEPHONE ENCOUNTER
Pt is requesting that lab orders be placed for \"everything\". Pt is requesting a urin test and a PSA test as well. Pt is requesting that these labs be sent to [address] [place] [address] Rd would prefer to complete these before 8/23 apt.       Please Advise

## 2021-07-16 NOTE — TELEPHONE ENCOUNTER
See below. Coming in for px in august.   Pt has some labs in system from 4.22, I will let him know. Was referred to uro for his elevated psa. Has seen them in past.  Are you OK in ordering for him? I pended if you agree. Also pended cbc.

## 2021-07-26 RX ORDER — SITAGLIPTIN 50 MG/1
TABLET, FILM COATED ORAL
Qty: 90 TABLET | Refills: 0 | Status: SHIPPED | OUTPATIENT
Start: 2021-07-26 | End: 2021-10-31

## 2021-08-14 LAB
ABSOLUTE BASOPHILS: 28 CELLS/UL (ref 0–200)
ABSOLUTE EOSINOPHILS: 78 CELLS/UL (ref 15–500)
ABSOLUTE LYMPHOCYTES: 2726 CELLS/UL (ref 850–3900)
ABSOLUTE MONOCYTES: 518 CELLS/UL (ref 200–950)
ABSOLUTE NEUTROPHILS: 3749 CELLS/UL (ref 1500–7800)
BASOPHILS: 0.4 %
EOSINOPHILS: 1.1 %
HEMATOCRIT: 39.8 % (ref 38.5–50)
HEMOGLOBIN: 12.8 G/DL (ref 13.2–17.1)
LYMPHOCYTES: 38.4 %
MCH: 30.5 PG (ref 27–33)
MCHC: 32.2 G/DL (ref 32–36)
MCV: 95 FL (ref 80–100)
MONOCYTES: 7.3 %
MPV: 9.7 FL (ref 7.5–12.5)
NEUTROPHILS: 52.8 %
PLATELET COUNT: 210 THOUSAND/UL (ref 140–400)
PSA, TOTAL: 3.7 NG/ML
RDW: 12.8 % (ref 11–15)
RED BLOOD CELL COUNT: 4.19 MILLION/UL (ref 4.2–5.8)
WHITE BLOOD CELL COUNT: 7.1 THOUSAND/UL (ref 3.8–10.8)

## 2021-08-16 ENCOUNTER — TELEPHONE (OUTPATIENT)
Dept: FAMILY MEDICINE CLINIC | Facility: CLINIC | Age: 81
End: 2021-08-16

## 2021-08-16 NOTE — TELEPHONE ENCOUNTER
He would only need the labs originally ordered on 4/22/2021 to be done prior to his visit in October

## 2021-08-16 NOTE — TELEPHONE ENCOUNTER
Pt. Called regarding the results of his CBC and PSA. He also wanted to know why other labs for his diabetes were not drawn. I reviewed the chart notes. Pt.s LOV for his diabetic check was on 04/12/2021 with Dr. Carlita Mcguire.  At that visit he was told to howard with the pt.

## 2021-08-16 NOTE — TELEPHONE ENCOUNTER
LOV: 4/16/2021 Med follow-up  Patient to RTC: 6 months--> 10/2021    I spoke with patient.  He states that he was nervous when he spoke to [place] and didn't mean to yell or be disrespectful, he verbalized that the was fearful the labs weren't ordered on purpo

## 2021-10-18 ENCOUNTER — OFFICE VISIT (OUTPATIENT)
Dept: FAMILY MEDICINE CLINIC | Facility: CLINIC | Age: 81
End: 2021-10-18
Payer: COMMERCIAL

## 2021-10-18 VITALS
HEIGHT: 66.5 IN | BODY MASS INDEX: 22.39 KG/M2 | HEART RATE: 74 BPM | RESPIRATION RATE: 18 BRPM | DIASTOLIC BLOOD PRESSURE: 72 MMHG | WEIGHT: 141 LBS | TEMPERATURE: 98 F | SYSTOLIC BLOOD PRESSURE: 120 MMHG

## 2021-10-18 DIAGNOSIS — D64.9 MILD ANEMIA: ICD-10-CM

## 2021-10-18 DIAGNOSIS — N40.1 BENIGN PROSTATIC HYPERPLASIA WITH WEAK URINARY STREAM: ICD-10-CM

## 2021-10-18 DIAGNOSIS — N39.41 URGE INCONTINENCE: ICD-10-CM

## 2021-10-18 DIAGNOSIS — R39.12 BENIGN PROSTATIC HYPERPLASIA WITH WEAK URINARY STREAM: ICD-10-CM

## 2021-10-18 DIAGNOSIS — Z00.00 ANNUAL PHYSICAL EXAM: Primary | ICD-10-CM

## 2021-10-18 DIAGNOSIS — E11.9 TYPE 2 DIABETES MELLITUS WITHOUT COMPLICATION, WITHOUT LONG-TERM CURRENT USE OF INSULIN (HCC): ICD-10-CM

## 2021-10-18 DIAGNOSIS — Z87.898 HISTORY OF ELEVATED PSA: ICD-10-CM

## 2021-10-18 PROCEDURE — 99397 PER PM REEVAL EST PAT 65+ YR: CPT | Performed by: FAMILY MEDICINE

## 2021-10-18 PROCEDURE — 3008F BODY MASS INDEX DOCD: CPT | Performed by: FAMILY MEDICINE

## 2021-10-18 PROCEDURE — 3074F SYST BP LT 130 MM HG: CPT | Performed by: FAMILY MEDICINE

## 2021-10-18 PROCEDURE — 3078F DIAST BP <80 MM HG: CPT | Performed by: FAMILY MEDICINE

## 2021-10-18 RX ORDER — SOLIFENACIN SUCCINATE 5 MG/1
5 TABLET, FILM COATED ORAL DAILY
Qty: 30 TABLET | Refills: 5 | Status: SHIPPED | OUTPATIENT
Start: 2021-10-18

## 2021-10-18 NOTE — PROGRESS NOTES
Tamera Duane is a [de-identified]year old male who presents for a complete physical exam.   HPI:   The patient presents for recheck of his diabetes. Patient’s FBS have been 120-150. Last HgbA1c was 7.8 %, done on 10/14/2021.  Last visit with ophthalmologist was 6/24/2019 Take 1 capsule (0.5 mg total) by mouth daily. 90 capsule 3       PAST MEDICAL HISTORY:   History reviewed. No pertinent past medical history.     PAST SURGICAL HISTORY:     Past Surgical History:   Procedure Laterality Date   • COLONOSCOPY     • OTHER SURGI Pulse exam Left:    pedal 2+.    Pretibial edema: No lymphadema in the legs or feet:  GI: good BS's,no masses, HSM or tenderness  : two descended testes,no masses,no hernia,no penile lesions  RECTAL: good rectal tone, prostate enlarged, smooth but show

## 2021-10-26 ENCOUNTER — TELEPHONE (OUTPATIENT)
Dept: FAMILY MEDICINE CLINIC | Facility: CLINIC | Age: 81
End: 2021-10-26

## 2021-10-29 DIAGNOSIS — E11.65 UNCONTROLLED TYPE 2 DIABETES MELLITUS WITH HYPERGLYCEMIA (HCC): ICD-10-CM

## 2021-10-31 RX ORDER — SITAGLIPTIN 50 MG/1
TABLET, FILM COATED ORAL
Qty: 90 TABLET | Refills: 0 | Status: SHIPPED | OUTPATIENT
Start: 2021-10-31 | End: 2022-01-24

## 2021-11-12 ENCOUNTER — MED REC SCAN ONLY (OUTPATIENT)
Dept: FAMILY MEDICINE CLINIC | Facility: CLINIC | Age: 81
End: 2021-11-12

## 2022-01-24 DIAGNOSIS — E11.65 UNCONTROLLED TYPE 2 DIABETES MELLITUS WITH HYPERGLYCEMIA (HCC): ICD-10-CM

## 2022-01-24 RX ORDER — SITAGLIPTIN 50 MG/1
TABLET, FILM COATED ORAL
Qty: 90 TABLET | Refills: 0 | Status: SHIPPED | OUTPATIENT
Start: 2022-01-24

## 2022-02-21 RX ORDER — DUTASTERIDE 0.5 MG/1
CAPSULE, LIQUID FILLED ORAL
Qty: 30 CAPSULE | Refills: 0 | Status: SHIPPED | OUTPATIENT
Start: 2022-02-21

## 2022-04-04 NOTE — TELEPHONE ENCOUNTER
----- Message from David Caballero sent at 3/3/2022 12:03 PM CST -----  Regarding: Breast Pump  Contact: MELECIO PIERSON [[id number]]  [phone]    Additional Information: [facility] states they still have not received prescription for breast pump. Please advise    
Received a faxed request for pt signature and date of collection of a 4K score urine test. I faxed the form to the [employer] office and received a fax confirmation.
This encounter is now closed. RN completed the form, had it signed and faxed yesterday 12/29. Update: RN already received the 4Kscore result. MD to review.
No

## 2022-04-28 RX ORDER — SITAGLIPTIN 50 MG/1
TABLET, FILM COATED ORAL
Qty: 90 TABLET | Refills: 0 | Status: SHIPPED | OUTPATIENT
Start: 2022-04-28

## 2022-07-20 DIAGNOSIS — E11.65 UNCONTROLLED TYPE 2 DIABETES MELLITUS WITH HYPERGLYCEMIA (HCC): ICD-10-CM

## 2022-07-25 DIAGNOSIS — E11.65 UNCONTROLLED TYPE 2 DIABETES MELLITUS WITH HYPERGLYCEMIA (HCC): ICD-10-CM

## 2022-07-26 DIAGNOSIS — E11.9 TYPE 2 DIABETES MELLITUS WITHOUT COMPLICATION, WITHOUT LONG-TERM CURRENT USE OF INSULIN (HCC): ICD-10-CM

## 2022-07-26 DIAGNOSIS — E11.65 UNCONTROLLED TYPE 2 DIABETES MELLITUS WITH HYPERGLYCEMIA (HCC): ICD-10-CM

## 2022-07-26 RX ORDER — SITAGLIPTIN 50 MG/1
TABLET, FILM COATED ORAL
Qty: 30 TABLET | Refills: 0 | Status: SHIPPED | OUTPATIENT
Start: 2022-07-26 | End: 2022-07-26

## 2022-07-26 RX ORDER — SITAGLIPTIN 50 MG/1
TABLET, FILM COATED ORAL
Qty: 30 TABLET | Refills: 0 | Status: SHIPPED | OUTPATIENT
Start: 2022-07-26

## 2022-08-06 LAB
ABSOLUTE BASOPHILS: 31 CELLS/UL (ref 0–200)
ABSOLUTE EOSINOPHILS: 112 CELLS/UL (ref 15–500)
ABSOLUTE LYMPHOCYTES: 2294 CELLS/UL (ref 850–3900)
ABSOLUTE MONOCYTES: 490 CELLS/UL (ref 200–950)
ABSOLUTE NEUTROPHILS: 3274 CELLS/UL (ref 1500–7800)
ALBUMIN/GLOBULIN RATIO: 1.4 (CALC) (ref 1–2.5)
ALBUMIN: 3.9 G/DL (ref 3.6–5.1)
ALKALINE PHOSPHATASE: 34 U/L (ref 35–144)
ALT: 22 U/L (ref 9–46)
AST: 22 U/L (ref 10–35)
BASOPHILS: 0.5 %
BILIRUBIN, TOTAL: 0.7 MG/DL (ref 0.2–1.2)
BUN: 19 MG/DL (ref 7–25)
CALCIUM: 8.7 MG/DL (ref 8.6–10.3)
CARBON DIOXIDE: 26 MMOL/L (ref 20–32)
CHLORIDE: 107 MMOL/L (ref 98–110)
CHOL/HDLC RATIO: 3.2 (CALC)
CHOLESTEROL, TOTAL: 169 MG/DL
CREATININE, RANDOM URINE: 160 MG/DL (ref 20–320)
CREATININE: 1.09 MG/DL (ref 0.7–1.22)
EGFR: 68 ML/MIN/1.73M2
EOSINOPHILS: 1.8 %
GLOBULIN: 2.7 G/DL (CALC) (ref 1.9–3.7)
GLUCOSE: 195 MG/DL (ref 65–99)
HDL CHOLESTEROL: 53 MG/DL
HEMATOCRIT: 37 % (ref 38.5–50)
HEMOGLOBIN A1C: 7.9 % OF TOTAL HGB
HEMOGLOBIN: 12.1 G/DL (ref 13.2–17.1)
LDL-CHOLESTEROL: 97 MG/DL (CALC)
LYMPHOCYTES: 37 %
MCH: 31.2 PG (ref 27–33)
MCHC: 32.7 G/DL (ref 32–36)
MCV: 95.4 FL (ref 80–100)
MICROALBUMIN/CREATININE RATIO, RANDOM URINE: 16 MCG/MG CREAT
MICROALBUMIN: 2.5 MG/DL
MONOCYTES: 7.9 %
MPV: 9.7 FL (ref 7.5–12.5)
NEUTROPHILS: 52.8 %
NON-HDL CHOLESTEROL: 116 MG/DL (CALC)
PLATELET COUNT: 183 THOUSAND/UL (ref 140–400)
POTASSIUM: 4.1 MMOL/L (ref 3.5–5.3)
PROTEIN, TOTAL: 6.6 G/DL (ref 6.1–8.1)
RDW: 12.8 % (ref 11–15)
RED BLOOD CELL COUNT: 3.88 MILLION/UL (ref 4.2–5.8)
SODIUM: 140 MMOL/L (ref 135–146)
TRIGLYCERIDES: 91 MG/DL
WHITE BLOOD CELL COUNT: 6.2 THOUSAND/UL (ref 3.8–10.8)

## 2022-08-08 ENCOUNTER — OFFICE VISIT (OUTPATIENT)
Dept: FAMILY MEDICINE CLINIC | Facility: CLINIC | Age: 82
End: 2022-08-08
Payer: COMMERCIAL

## 2022-08-08 VITALS
WEIGHT: 139 LBS | SYSTOLIC BLOOD PRESSURE: 118 MMHG | HEIGHT: 66.5 IN | HEART RATE: 72 BPM | TEMPERATURE: 98 F | DIASTOLIC BLOOD PRESSURE: 58 MMHG | BODY MASS INDEX: 22.08 KG/M2

## 2022-08-08 DIAGNOSIS — E11.65 UNCONTROLLED TYPE 2 DIABETES MELLITUS WITH HYPERGLYCEMIA (HCC): ICD-10-CM

## 2022-08-08 DIAGNOSIS — R39.12 BENIGN PROSTATIC HYPERPLASIA WITH WEAK URINARY STREAM: Primary | ICD-10-CM

## 2022-08-08 DIAGNOSIS — Z87.898 HISTORY OF ELEVATED PSA: ICD-10-CM

## 2022-08-08 DIAGNOSIS — N40.1 BENIGN PROSTATIC HYPERPLASIA WITH WEAK URINARY STREAM: Primary | ICD-10-CM

## 2022-08-08 DIAGNOSIS — E11.9 TYPE 2 DIABETES MELLITUS WITHOUT COMPLICATION, WITHOUT LONG-TERM CURRENT USE OF INSULIN (HCC): ICD-10-CM

## 2022-08-08 PROCEDURE — 99214 OFFICE O/P EST MOD 30 MIN: CPT | Performed by: FAMILY MEDICINE

## 2022-08-08 PROCEDURE — 3008F BODY MASS INDEX DOCD: CPT | Performed by: FAMILY MEDICINE

## 2022-08-08 PROCEDURE — 3074F SYST BP LT 130 MM HG: CPT | Performed by: FAMILY MEDICINE

## 2022-08-08 PROCEDURE — 3078F DIAST BP <80 MM HG: CPT | Performed by: FAMILY MEDICINE

## 2022-08-09 DIAGNOSIS — R73.9 HYPERGLYCEMIA: Primary | ICD-10-CM

## 2022-08-10 LAB
ABSOLUTE BASOPHILS: 31 CELLS/UL (ref 0–200)
ABSOLUTE EOSINOPHILS: 112 CELLS/UL (ref 15–500)
ABSOLUTE LYMPHOCYTES: 2294 CELLS/UL (ref 850–3900)
ABSOLUTE MONOCYTES: 490 CELLS/UL (ref 200–950)
ABSOLUTE NEUTROPHILS: 3274 CELLS/UL (ref 1500–7800)
ALBUMIN/GLOBULIN RATIO: 1.4 (CALC) (ref 1–2.5)
ALBUMIN: 3.9 G/DL (ref 3.6–5.1)
ALKALINE PHOSPHATASE: 34 U/L (ref 35–144)
ALT: 22 U/L (ref 9–46)
AST: 22 U/L (ref 10–35)
BASOPHILS: 0.5 %
BILIRUBIN, TOTAL: 0.7 MG/DL (ref 0.2–1.2)
BUN: 19 MG/DL (ref 7–25)
CALCIUM: 8.7 MG/DL (ref 8.6–10.3)
CARBON DIOXIDE: 26 MMOL/L (ref 20–32)
CHLORIDE: 107 MMOL/L (ref 98–110)
CHOL/HDLC RATIO: 3.2 (CALC)
CHOLESTEROL, TOTAL: 169 MG/DL
CREATININE, RANDOM URINE: 160 MG/DL (ref 20–320)
CREATININE: 1.09 MG/DL (ref 0.7–1.22)
EGFR: 68 ML/MIN/1.73M2
EOSINOPHILS: 1.8 %
GLOBULIN: 2.7 G/DL (CALC) (ref 1.9–3.7)
GLUCOSE: 195 MG/DL (ref 65–99)
HDL CHOLESTEROL: 53 MG/DL
HEMATOCRIT: 37 % (ref 38.5–50)
HEMOGLOBIN A1C: 7.9 % OF TOTAL HGB
HEMOGLOBIN: 12.1 G/DL (ref 13.2–17.1)
LDL-CHOLESTEROL: 97 MG/DL (CALC)
LYMPHOCYTES: 37 %
MCH: 31.2 PG (ref 27–33)
MCHC: 32.7 G/DL (ref 32–36)
MCV: 95.4 FL (ref 80–100)
MICROALBUMIN/CREATININE RATIO, RANDOM URINE: 16 MCG/MG CREAT
MICROALBUMIN: 2.5 MG/DL
MONOCYTES: 7.9 %
MPV: 9.7 FL (ref 7.5–12.5)
NEUTROPHILS: 52.8 %
NON-HDL CHOLESTEROL: 116 MG/DL (CALC)
PLATELET COUNT: 183 THOUSAND/UL (ref 140–400)
POTASSIUM: 4.1 MMOL/L (ref 3.5–5.3)
PROTEIN, TOTAL: 6.6 G/DL (ref 6.1–8.1)
PSA, TOTAL: 9.53 NG/ML
RDW: 12.8 % (ref 11–15)
RED BLOOD CELL COUNT: 3.88 MILLION/UL (ref 4.2–5.8)
SODIUM: 140 MMOL/L (ref 135–146)
TRIGLYCERIDES: 91 MG/DL
WHITE BLOOD CELL COUNT: 6.2 THOUSAND/UL (ref 3.8–10.8)

## 2022-09-08 ENCOUNTER — MED REC SCAN ONLY (OUTPATIENT)
Dept: FAMILY MEDICINE CLINIC | Facility: CLINIC | Age: 82
End: 2022-09-08

## 2022-11-07 RX ORDER — SITAGLIPTIN 100 MG/1
TABLET, FILM COATED ORAL
Qty: 90 TABLET | Refills: 0 | Status: SHIPPED | OUTPATIENT
Start: 2022-11-07

## 2022-11-30 ENCOUNTER — TELEPHONE (OUTPATIENT)
Dept: FAMILY MEDICINE CLINIC | Facility: CLINIC | Age: 82
End: 2022-11-30

## 2022-11-30 NOTE — TELEPHONE ENCOUNTER
Pt is requesting apt for annual physical prior to next available date of 1/24/2023. Pt is hoping to complete this appointment in December if possible?      Please advise if you are willing to accommodate pt's request?

## 2022-12-27 ENCOUNTER — TELEPHONE (OUTPATIENT)
Dept: FAMILY MEDICINE CLINIC | Facility: CLINIC | Age: 82
End: 2022-12-27

## 2022-12-27 DIAGNOSIS — E11.65 UNCONTROLLED TYPE 2 DIABETES MELLITUS WITH HYPERGLYCEMIA (HCC): ICD-10-CM

## 2022-12-27 DIAGNOSIS — R39.12 BENIGN PROSTATIC HYPERPLASIA WITH WEAK URINARY STREAM: Primary | ICD-10-CM

## 2022-12-27 DIAGNOSIS — N40.1 BENIGN PROSTATIC HYPERPLASIA WITH WEAK URINARY STREAM: Primary | ICD-10-CM

## 2022-12-27 DIAGNOSIS — Z87.898 HISTORY OF ELEVATED PSA: ICD-10-CM

## 2022-12-27 NOTE — TELEPHONE ENCOUNTER
Per chart labs are in system from Aug. Pt wife reports he will be on medicare in feb 1. Not sure if [facility] will see orders from then. I have re-ordered for her and let her know order date of today. She voiced understanding.

## 2022-12-27 NOTE — TELEPHONE ENCOUNTER
Pt wife calling requesting blood work orders be placed in the system before next scheduled appt so that results can be discussed in next upcoming appt.      Please advise

## 2023-02-02 DIAGNOSIS — E11.65 UNCONTROLLED TYPE 2 DIABETES MELLITUS WITH HYPERGLYCEMIA (HCC): ICD-10-CM

## 2023-02-03 RX ORDER — SITAGLIPTIN 100 MG/1
TABLET, FILM COATED ORAL
Qty: 90 TABLET | Refills: 0 | Status: SHIPPED | OUTPATIENT
Start: 2023-02-03

## 2023-02-04 LAB
ALBUMIN/GLOBULIN RATIO: 1.6 (CALC) (ref 1–2.5)
ALBUMIN: 4.5 G/DL (ref 3.6–5.1)
ALKALINE PHOSPHATASE: 38 U/L (ref 35–144)
ALT: 15 U/L (ref 9–46)
AST: 17 U/L (ref 10–35)
BILIRUBIN, TOTAL: 0.6 MG/DL (ref 0.2–1.2)
BUN: 19 MG/DL (ref 7–25)
CALCIUM: 9.9 MG/DL (ref 8.6–10.3)
CARBON DIOXIDE: 27 MMOL/L (ref 20–32)
CHLORIDE: 103 MMOL/L (ref 98–110)
CREATININE: 1.14 MG/DL (ref 0.7–1.22)
EGFR: 65 ML/MIN/1.73M2
GLOBULIN: 2.8 G/DL (CALC) (ref 1.9–3.7)
GLUCOSE: 147 MG/DL (ref 65–99)
HEMOGLOBIN A1C: 8.3 % OF TOTAL HGB
POTASSIUM: 4.3 MMOL/L (ref 3.5–5.3)
PROTEIN, TOTAL: 7.3 G/DL (ref 6.1–8.1)
PSA, TOTAL: 7.7 NG/ML
SODIUM: 139 MMOL/L (ref 135–146)

## 2023-02-09 ENCOUNTER — OFFICE VISIT (OUTPATIENT)
Dept: FAMILY MEDICINE CLINIC | Facility: CLINIC | Age: 83
End: 2023-02-09
Payer: MEDICARE

## 2023-02-09 VITALS
TEMPERATURE: 98 F | HEIGHT: 66 IN | BODY MASS INDEX: 22.98 KG/M2 | RESPIRATION RATE: 16 BRPM | SYSTOLIC BLOOD PRESSURE: 122 MMHG | WEIGHT: 143 LBS | DIASTOLIC BLOOD PRESSURE: 74 MMHG | HEART RATE: 80 BPM

## 2023-02-09 DIAGNOSIS — N40.1 BENIGN PROSTATIC HYPERPLASIA WITH WEAK URINARY STREAM: ICD-10-CM

## 2023-02-09 DIAGNOSIS — Z13.0 SCREENING FOR IRON DEFICIENCY ANEMIA: ICD-10-CM

## 2023-02-09 DIAGNOSIS — E11.9 TYPE 2 DIABETES MELLITUS WITHOUT COMPLICATION, WITHOUT LONG-TERM CURRENT USE OF INSULIN (HCC): Primary | ICD-10-CM

## 2023-02-09 DIAGNOSIS — R39.12 BENIGN PROSTATIC HYPERPLASIA WITH WEAK URINARY STREAM: ICD-10-CM

## 2023-02-09 DIAGNOSIS — R97.20 ELEVATED PSA: ICD-10-CM

## 2023-02-14 ENCOUNTER — MED REC SCAN ONLY (OUTPATIENT)
Dept: FAMILY MEDICINE CLINIC | Facility: CLINIC | Age: 83
End: 2023-02-14

## 2023-04-30 DIAGNOSIS — E11.65 UNCONTROLLED TYPE 2 DIABETES MELLITUS WITH HYPERGLYCEMIA (HCC): ICD-10-CM

## 2023-07-20 ENCOUNTER — TELEPHONE (OUTPATIENT)
Dept: FAMILY MEDICINE CLINIC | Facility: CLINIC | Age: 83
End: 2023-07-20

## 2023-07-27 DIAGNOSIS — E11.65 UNCONTROLLED TYPE 2 DIABETES MELLITUS WITH HYPERGLYCEMIA (HCC): ICD-10-CM

## 2023-08-09 LAB
ABSOLUTE BASOPHILS: 49 CELLS/UL (ref 0–200)
ABSOLUTE EOSINOPHILS: 98 CELLS/UL (ref 15–500)
ABSOLUTE LYMPHOCYTES: 2863 CELLS/UL (ref 850–3900)
ABSOLUTE MONOCYTES: 518 CELLS/UL (ref 200–950)
ABSOLUTE NEUTROPHILS: 3472 CELLS/UL (ref 1500–7800)
ALBUMIN/GLOBULIN RATIO: 1.5 (CALC) (ref 1–2.5)
ALBUMIN: 4.9 G/DL (ref 3.6–5.1)
ALKALINE PHOSPHATASE: 41 U/L (ref 35–144)
ALT: 12 U/L (ref 9–46)
AST: 17 U/L (ref 10–35)
BASOPHILS: 0.7 %
BILIRUBIN, TOTAL: 0.8 MG/DL (ref 0.2–1.2)
BUN: 20 MG/DL (ref 7–25)
CALCIUM: 9.9 MG/DL (ref 8.6–10.3)
CARBON DIOXIDE: 30 MMOL/L (ref 20–32)
CHLORIDE: 101 MMOL/L (ref 98–110)
CHOL/HDLC RATIO: 2.8 (CALC)
CHOLESTEROL, TOTAL: 177 MG/DL
CREATININE, RANDOM URINE: 81 MG/DL (ref 20–320)
CREATININE: 1.2 MG/DL (ref 0.7–1.22)
EGFR: 60 ML/MIN/1.73M2
EOSINOPHILS: 1.4 %
GLOBULIN: 3.2 G/DL (CALC) (ref 1.9–3.7)
GLUCOSE: 141 MG/DL (ref 65–99)
HDL CHOLESTEROL: 63 MG/DL
HEMATOCRIT: 41.6 % (ref 38.5–50)
HEMOGLOBIN A1C: 7.7 % OF TOTAL HGB
HEMOGLOBIN: 13.5 G/DL (ref 13.2–17.1)
LDL-CHOLESTEROL: 94 MG/DL (CALC)
LYMPHOCYTES: 40.9 %
MCH: 30.9 PG (ref 27–33)
MCHC: 32.5 G/DL (ref 32–36)
MCV: 95.2 FL (ref 80–100)
MICROALBUMIN/CREATININE RATIO, RANDOM URINE: 6 MCG/MG CREAT
MICROALBUMIN: 0.5 MG/DL
MONOCYTES: 7.4 %
MPV: 9.8 FL (ref 7.5–12.5)
NEUTROPHILS: 49.6 %
NON-HDL CHOLESTEROL: 114 MG/DL (CALC)
PLATELET COUNT: 189 THOUSAND/UL (ref 140–400)
POTASSIUM: 3.9 MMOL/L (ref 3.5–5.3)
PROTEIN, TOTAL: 8.1 G/DL (ref 6.1–8.1)
RDW: 12.7 % (ref 11–15)
RED BLOOD CELL COUNT: 4.37 MILLION/UL (ref 4.2–5.8)
SODIUM: 138 MMOL/L (ref 135–146)
TRIGLYCERIDES: 100 MG/DL
WHITE BLOOD CELL COUNT: 7 THOUSAND/UL (ref 3.8–10.8)

## 2023-08-10 ENCOUNTER — EKG ENCOUNTER (OUTPATIENT)
Dept: LAB | Age: 83
End: 2023-08-10
Attending: NURSE PRACTITIONER
Payer: MEDICARE

## 2023-08-10 ENCOUNTER — OFFICE VISIT (OUTPATIENT)
Dept: FAMILY MEDICINE CLINIC | Facility: CLINIC | Age: 83
End: 2023-08-10
Payer: MEDICARE

## 2023-08-10 VITALS
HEART RATE: 72 BPM | TEMPERATURE: 97 F | HEIGHT: 66.14 IN | WEIGHT: 133 LBS | DIASTOLIC BLOOD PRESSURE: 54 MMHG | BODY MASS INDEX: 21.38 KG/M2 | SYSTOLIC BLOOD PRESSURE: 120 MMHG | RESPIRATION RATE: 14 BRPM

## 2023-08-10 DIAGNOSIS — N39.41 URGE INCONTINENCE: ICD-10-CM

## 2023-08-10 DIAGNOSIS — Z00.00 ENCOUNTER FOR ANNUAL HEALTH EXAMINATION: ICD-10-CM

## 2023-08-10 DIAGNOSIS — Z12.5 SCREENING FOR PROSTATE CANCER: ICD-10-CM

## 2023-08-10 DIAGNOSIS — R97.20 ELEVATED PSA: ICD-10-CM

## 2023-08-10 DIAGNOSIS — E11.9 TYPE 2 DIABETES MELLITUS WITHOUT COMPLICATION, WITHOUT LONG-TERM CURRENT USE OF INSULIN (HCC): ICD-10-CM

## 2023-08-10 DIAGNOSIS — N40.1 BENIGN PROSTATIC HYPERPLASIA WITH WEAK URINARY STREAM: ICD-10-CM

## 2023-08-10 DIAGNOSIS — R39.12 BENIGN PROSTATIC HYPERPLASIA WITH WEAK URINARY STREAM: ICD-10-CM

## 2023-08-10 DIAGNOSIS — Z00.00 ENCOUNTER FOR ANNUAL HEALTH EXAMINATION: Primary | ICD-10-CM

## 2023-08-10 PROCEDURE — 99204 OFFICE O/P NEW MOD 45 MIN: CPT | Performed by: NURSE PRACTITIONER

## 2023-08-10 PROCEDURE — G0402 INITIAL PREVENTIVE EXAM: HCPCS | Performed by: NURSE PRACTITIONER

## 2023-08-10 PROCEDURE — 93005 ELECTROCARDIOGRAM TRACING: CPT

## 2023-08-10 PROCEDURE — 93010 ELECTROCARDIOGRAM REPORT: CPT | Performed by: INTERNAL MEDICINE

## 2023-08-11 LAB
ATRIAL RATE: 64 BPM
P AXIS: 66 DEGREES
P-R INTERVAL: 210 MS
Q-T INTERVAL: 370 MS
QRS DURATION: 88 MS
QTC CALCULATION (BEZET): 381 MS
R AXIS: 56 DEGREES
T AXIS: 49 DEGREES
VENTRICULAR RATE: 64 BPM

## 2023-08-17 ENCOUNTER — TELEPHONE (OUTPATIENT)
Dept: FAMILY MEDICINE CLINIC | Facility: CLINIC | Age: 83
End: 2023-08-17

## 2023-08-17 NOTE — TELEPHONE ENCOUNTER
Please call patient. At [address] pt inquired about taking an rx of Paxlovid for upcoming travel to [place]. [address] for [employer]- will send rx. Recommend taking COVID testing with them to test, if positive start medication. Do NOT take if not COVID positive.

## 2023-11-20 ENCOUNTER — OFFICE VISIT (OUTPATIENT)
Dept: FAMILY MEDICINE CLINIC | Facility: CLINIC | Age: 83
End: 2023-11-20
Payer: MEDICARE

## 2023-11-20 VITALS
DIASTOLIC BLOOD PRESSURE: 70 MMHG | OXYGEN SATURATION: 99 % | RESPIRATION RATE: 16 BRPM | SYSTOLIC BLOOD PRESSURE: 102 MMHG | TEMPERATURE: 98 F | HEART RATE: 73 BPM | HEIGHT: 64 IN | BODY MASS INDEX: 22.71 KG/M2 | WEIGHT: 133 LBS

## 2023-11-20 DIAGNOSIS — E11.9 DIABETES MELLITUS TYPE 2, NONINSULIN DEPENDENT (HCC): ICD-10-CM

## 2023-11-20 DIAGNOSIS — R31.9 HEMATURIA, UNSPECIFIED TYPE: Primary | ICD-10-CM

## 2023-11-20 PROCEDURE — 99214 OFFICE O/P EST MOD 30 MIN: CPT | Performed by: FAMILY MEDICINE

## 2023-11-23 LAB
ALBUMIN/GLOBULIN RATIO: 1.4 (CALC) (ref 1–2.5)
ALBUMIN: 4.4 G/DL (ref 3.6–5.1)
ALKALINE PHOSPHATASE: 36 U/L (ref 35–144)
ALT: 14 U/L (ref 9–46)
APPEARANCE: CLEAR
AST: 16 U/L (ref 10–35)
BILIRUBIN, TOTAL: 0.9 MG/DL (ref 0.2–1.2)
BILIRUBIN: NEGATIVE
BUN: 21 MG/DL (ref 7–25)
CALCIUM: 9.8 MG/DL (ref 8.6–10.3)
CARBON DIOXIDE: 30 MMOL/L (ref 20–32)
CHLORIDE: 102 MMOL/L (ref 98–110)
CHOL/HDLC RATIO: 3 (CALC)
CHOLESTEROL, TOTAL: 193 MG/DL
COLOR: YELLOW
CREATININE, RANDOM URINE: 82 MG/DL (ref 20–320)
CREATININE: 1.11 MG/DL (ref 0.7–1.22)
EGFR: 66 ML/MIN/1.73M2
GLOBULIN: 3.2 G/DL (CALC) (ref 1.9–3.7)
GLUCOSE: 169 MG/DL (ref 65–99)
GLUCOSE: NEGATIVE
HDL CHOLESTEROL: 64 MG/DL
HEMOGLOBIN A1C: 7.8 % OF TOTAL HGB
KETONES: NEGATIVE
LDL-CHOLESTEROL: 112 MG/DL (CALC)
LEUKOCYTE ESTERASE: NEGATIVE
MICROALBUMIN/CREATININE RATIO, RANDOM URINE: 10 MCG/MG CREAT
MICROALBUMIN: 0.8 MG/DL
NITRITE: NEGATIVE
NON-HDL CHOLESTEROL: 129 MG/DL (CALC)
OCCULT BLOOD: NEGATIVE
POTASSIUM: 4.1 MMOL/L (ref 3.5–5.3)
PROTEIN, TOTAL: 7.6 G/DL (ref 6.1–8.1)
PROTEIN: NEGATIVE
SODIUM: 138 MMOL/L (ref 135–146)
SPECIFIC GRAVITY: 1.01 (ref 1–1.03)
TRIGLYCERIDES: 81 MG/DL

## 2023-11-28 ENCOUNTER — OFFICE VISIT (OUTPATIENT)
Dept: FAMILY MEDICINE CLINIC | Facility: CLINIC | Age: 83
End: 2023-11-28
Payer: MEDICARE

## 2023-11-28 VITALS
TEMPERATURE: 97 F | WEIGHT: 133 LBS | OXYGEN SATURATION: 98 % | HEART RATE: 80 BPM | BODY MASS INDEX: 22.71 KG/M2 | SYSTOLIC BLOOD PRESSURE: 102 MMHG | HEIGHT: 64 IN | DIASTOLIC BLOOD PRESSURE: 64 MMHG | RESPIRATION RATE: 16 BRPM

## 2023-11-28 DIAGNOSIS — Z13.0 SCREENING FOR IRON DEFICIENCY ANEMIA: ICD-10-CM

## 2023-11-28 DIAGNOSIS — E11.9 TYPE 2 DIABETES MELLITUS WITHOUT COMPLICATION, WITHOUT LONG-TERM CURRENT USE OF INSULIN (HCC): ICD-10-CM

## 2023-11-28 DIAGNOSIS — N40.1 BENIGN PROSTATIC HYPERPLASIA WITH WEAK URINARY STREAM: ICD-10-CM

## 2023-11-28 DIAGNOSIS — Z13.220 SCREENING FOR LIPOID DISORDERS: ICD-10-CM

## 2023-11-28 DIAGNOSIS — Z87.898 HISTORY OF ELEVATED PSA: ICD-10-CM

## 2023-11-28 DIAGNOSIS — R39.12 BENIGN PROSTATIC HYPERPLASIA WITH WEAK URINARY STREAM: ICD-10-CM

## 2023-11-28 PROCEDURE — 99214 OFFICE O/P EST MOD 30 MIN: CPT | Performed by: FAMILY MEDICINE

## 2023-11-28 RX ORDER — EMPAGLIFLOZIN 25 MG/1
25 TABLET, FILM COATED ORAL DAILY
Qty: 30 TABLET | Refills: 3 | Status: SHIPPED | OUTPATIENT
Start: 2023-11-28

## 2024-03-30 DIAGNOSIS — D64.9 ANEMIA, UNSPECIFIED TYPE: Primary | ICD-10-CM

## 2024-03-31 LAB
% SATURATION: 33 % (CALC) (ref 20–48)
ABSOLUTE BASOPHILS: 48 CELLS/UL (ref 0–200)
ABSOLUTE EOSINOPHILS: 88 CELLS/UL (ref 15–500)
ABSOLUTE LYMPHOCYTES: 2747 CELLS/UL (ref 850–3900)
ABSOLUTE MONOCYTES: 524 CELLS/UL (ref 200–950)
ABSOLUTE NEUTROPHILS: 3393 CELLS/UL (ref 1500–7800)
ALBUMIN/GLOBULIN RATIO: 1.6 (CALC) (ref 1–2.5)
ALBUMIN: 4.4 G/DL (ref 3.6–5.1)
ALKALINE PHOSPHATASE: 32 U/L (ref 35–144)
ALT: 11 U/L (ref 9–46)
AST: 16 U/L (ref 10–35)
BASOPHILS: 0.7 %
BILIRUBIN, TOTAL: 0.6 MG/DL (ref 0.2–1.2)
BUN/CREATININE RATIO: 17 (CALC) (ref 6–22)
BUN: 21 MG/DL (ref 7–25)
CALCIUM: 9.7 MG/DL (ref 8.6–10.3)
CARBON DIOXIDE: 31 MMOL/L (ref 20–32)
CHLORIDE: 103 MMOL/L (ref 98–110)
CHOL/HDLC RATIO: 2.7 (CALC)
CHOLESTEROL, TOTAL: 170 MG/DL
CREATININE, RANDOM URINE: 87 MG/DL (ref 20–320)
CREATININE: 1.24 MG/DL (ref 0.7–1.22)
EGFR: 58 ML/MIN/1.73M2
EOSINOPHILS: 1.3 %
FERRITIN: 162 NG/ML (ref 24–380)
GLOBULIN: 2.8 G/DL (CALC) (ref 1.9–3.7)
GLUCOSE: 172 MG/DL (ref 65–99)
HDL CHOLESTEROL: 64 MG/DL
HEMATOCRIT: 39.7 % (ref 38.5–50)
HEMOGLOBIN A1C: 7.9 % OF TOTAL HGB
HEMOGLOBIN: 12.7 G/DL (ref 13.2–17.1)
IRON BINDING CAPACITY: 290 MCG/DL (CALC) (ref 250–425)
IRON, TOTAL: 95 MCG/DL (ref 50–180)
LDL-CHOLESTEROL: 87 MG/DL (CALC)
LYMPHOCYTES: 40.4 %
MCH: 31.2 PG (ref 27–33)
MCHC: 32 G/DL (ref 32–36)
MCV: 97.5 FL (ref 80–100)
MICROALBUMIN/CREATININE RATIO, RANDOM URINE: 9 MG/G CREAT
MICROALBUMIN: 0.8 MG/DL
MONOCYTES: 7.7 %
MPV: 9.5 FL (ref 7.5–12.5)
NEUTROPHILS: 49.9 %
NON-HDL CHOLESTEROL: 106 MG/DL (CALC)
PLATELET COUNT: 218 THOUSAND/UL (ref 140–400)
POTASSIUM: 4 MMOL/L (ref 3.5–5.3)
PROTEIN, TOTAL: 7.2 G/DL (ref 6.1–8.1)
PSA, TOTAL: 6.96 NG/ML
RDW: 12.7 % (ref 11–15)
RED BLOOD CELL COUNT: 4.07 MILLION/UL (ref 4.2–5.8)
SODIUM: 139 MMOL/L (ref 135–146)
TRIGLYCERIDES: 91 MG/DL
TSH W/REFLEX TO FT4: 3.74 MIU/L (ref 0.4–4.5)
WHITE BLOOD CELL COUNT: 6.8 THOUSAND/UL (ref 3.8–10.8)

## 2024-06-03 DIAGNOSIS — E11.9 TYPE 2 DIABETES MELLITUS WITHOUT COMPLICATION, WITHOUT LONG-TERM CURRENT USE OF INSULIN (HCC): ICD-10-CM

## 2024-06-04 RX ORDER — SITAGLIPTIN 100 MG/1
100 TABLET, FILM COATED ORAL DAILY
Qty: 90 TABLET | Refills: 0 | Status: SHIPPED | OUTPATIENT
Start: 2024-06-04

## 2024-08-09 ENCOUNTER — TELEPHONE (OUTPATIENT)
Dept: FAMILY MEDICINE CLINIC | Facility: CLINIC | Age: 84
End: 2024-08-09

## 2024-08-22 ENCOUNTER — TELEPHONE (OUTPATIENT)
Dept: FAMILY MEDICINE CLINIC | Facility: CLINIC | Age: 84
End: 2024-08-22

## 2024-08-24 ENCOUNTER — PATIENT OUTREACH (OUTPATIENT)
Dept: CASE MANAGEMENT | Age: 84
End: 2024-08-24

## 2024-08-24 NOTE — PROCEDURES
The office order for PCP removal request is Approved and finalized on August 24, 2024.    Removed Denis Burns MD as the patient's Primary Care Physician

## (undated) DIAGNOSIS — E11.65 UNCONTROLLED TYPE 2 DIABETES MELLITUS WITH HYPERGLYCEMIA (HCC): ICD-10-CM

## (undated) DIAGNOSIS — E11.9 TYPE 2 DIABETES MELLITUS WITHOUT COMPLICATION, WITHOUT LONG-TERM CURRENT USE OF INSULIN (HCC): Primary | ICD-10-CM

## (undated) NOTE — MR AVS SNAPSHOT
[facility], [address] [phone]               Thank you for choosing us for your health care visit with Aura Bassett MD.  We are glad to serve you and happy to provide you with this bhatt Today's Orders     Microalb/Creat Ratio, Random Urine    Complete by:  Jan 03, 2017              Referral Information     Referral Order Referred to [address] Phone Visits Status Diagnosis           Uncontrolled type 2 diabetes mellitus without complica

## (undated) NOTE — Clinical Note
06/21/2017              Dr Antonio Pollack reviewed your recent lab results and would like you to schedule a medication visit for follow up and to discuss your labs. Please call the office as soon as possible to schedule an appointment.     Thanks

## (undated) NOTE — LETTER
04/05/21        Mario LYON [address]      Dear Carleen Abbasi records indicate that you have outstanding lab work and or testing that was ordered for you and has not yet been completed:  Orders Placed This Encounter      COM